# Patient Record
(demographics unavailable — no encounter records)

---

## 2025-07-28 NOTE — DISCUSSION/SUMMARY
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  He would benefit from staged bilateral hip arthroscopies with labral repairs and osteochondroplasties.  The risks, benefits, and alternatives were reviewed.  He would like to proceed with surgery in the fall.

## 2025-07-28 NOTE — REASON FOR VISIT
[Mother] : mother [Home] : at home, [unfilled] , at the time of the visit. [Medical Office: ([facility])___] : at the medical office located in  [Telephone (audio)] : This telephonic visit was provided via audio only technology. [Patient preference] : patient preference.

## 2025-07-28 NOTE — PHYSICAL EXAM
[de-identified] : X-rays as well as MRIs of both hips were reviewed.  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  He has labral tears in both hips and early cartilage damage at the chondral labral junction.

## 2025-07-28 NOTE — HISTORY OF PRESENT ILLNESS
[de-identified] : This patient follows up for both hips.  He continues to have significant stiffness and soreness in both hips with physical activities.  He is currently living in [place] but will return to [place] in the fall.  He has done physical therapy in the past while in college.